# Patient Record
Sex: FEMALE | Race: WHITE | NOT HISPANIC OR LATINO
[De-identification: names, ages, dates, MRNs, and addresses within clinical notes are randomized per-mention and may not be internally consistent; named-entity substitution may affect disease eponyms.]

---

## 2020-01-09 PROBLEM — Z00.00 ENCOUNTER FOR PREVENTIVE HEALTH EXAMINATION: Status: ACTIVE | Noted: 2020-01-09

## 2020-01-10 ENCOUNTER — APPOINTMENT (OUTPATIENT)
Dept: ORTHOPEDIC SURGERY | Facility: CLINIC | Age: 45
End: 2020-01-10

## 2020-01-10 ENCOUNTER — APPOINTMENT (OUTPATIENT)
Dept: ORTHOPEDIC SURGERY | Facility: HOSPITAL | Age: 45
End: 2020-01-10

## 2020-01-13 ENCOUNTER — APPOINTMENT (OUTPATIENT)
Dept: ORTHOPEDIC SURGERY | Facility: CLINIC | Age: 45
End: 2020-01-13
Payer: MEDICAID

## 2020-01-13 VITALS
OXYGEN SATURATION: 98 % | BODY MASS INDEX: 18.25 KG/M2 | SYSTOLIC BLOOD PRESSURE: 118 MMHG | WEIGHT: 103 LBS | DIASTOLIC BLOOD PRESSURE: 74 MMHG | HEART RATE: 68 BPM | HEIGHT: 63 IN

## 2020-01-13 DIAGNOSIS — Z78.9 OTHER SPECIFIED HEALTH STATUS: ICD-10-CM

## 2020-01-13 DIAGNOSIS — Z87.81 PERSONAL HISTORY OF (HEALED) TRAUMATIC FRACTURE: ICD-10-CM

## 2020-01-13 PROCEDURE — 99203 OFFICE O/P NEW LOW 30 MIN: CPT

## 2020-01-13 NOTE — DISCUSSION/SUMMARY
[de-identified] : 45y/o female 2wk s/p left 5th metatarsal shaft fracture\par - WBAT, wean crutches gradually\par - Transition from hard soled shoe to supportive firm-soled regular shoewear as tolerated\par - Tylenol + Aleve as needed for pain\par - RTC 4wk with new left foot XRs

## 2020-01-13 NOTE — PHYSICAL EXAM
[de-identified] : General appearance: well nourished and hydrated, pleasant, alert and oriented x 3, cooperative.  \par HEENT: normocephalic, EOM intact, nasal septum midline, oral cavity clear, external auditory canal clear.  \par Cardiovascular: no lower leg edema, no varicosities, dorsalis pedis pulses palpable and symmetric.  \par Lymphatics: no palpable lymphadenopathy, no lymphedema.  \par Neurologic: sensation is normal, no muscle weakness in upper or lower extremities, patella tendon reflexes present and symmetric.  \par Dermatologic: skin moist, warm, no rash.  \par Spine: cervical spine with normal lordosis and painless range of motion, thoracic spine with normal kyphosis and painless range of motion, lumbosacral spine with normal lordosis and painless range of motion. \par Gait: deferred.\par \par Left foot\par - Inspection: mild swelling and ecchymosis throughout midfoot and forefoot. Negative erythema. No rotational deformity of the toes. Hallux valgus.\par - Wounds: none.  \par - ROM: able to flex/ext all toes.  \par - Palpation: appropriate tenderness to palpation along 5th metatarsal. Nontender at 1st TMT, no pain with 1st/2nd ray divergence.\par - Strength: deferred.\par - Stability: no instability noted.   [de-identified] : 3 views of the left foot were reviewed from outside imaging dated 1/3/20. There is a minimally displaced oblique fracture of the distal portion of the 5th metatarsal shaft without intra-articular extension. No other fractures identified. No arthritis. Hallux valgus.
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
## 2020-01-24 ENCOUNTER — APPOINTMENT (OUTPATIENT)
Dept: ORTHOPEDIC SURGERY | Facility: CLINIC | Age: 45
End: 2020-01-24
Payer: MEDICAID

## 2020-01-24 VITALS — WEIGHT: 103 LBS | RESPIRATION RATE: 16 BRPM | BODY MASS INDEX: 18.25 KG/M2 | HEIGHT: 63 IN

## 2020-01-24 PROCEDURE — 99213 OFFICE O/P EST LOW 20 MIN: CPT | Mod: 57

## 2020-01-24 PROCEDURE — 28470 CLTX METATARSAL FX WO MNP EA: CPT | Mod: LT

## 2020-01-24 PROCEDURE — 73630 X-RAY EXAM OF FOOT: CPT | Mod: LT

## 2020-02-10 ENCOUNTER — APPOINTMENT (OUTPATIENT)
Dept: ORTHOPEDIC SURGERY | Facility: CLINIC | Age: 45
End: 2020-02-10

## 2020-02-14 ENCOUNTER — APPOINTMENT (OUTPATIENT)
Dept: ORTHOPEDIC SURGERY | Facility: CLINIC | Age: 45
End: 2020-02-14
Payer: MEDICAID

## 2020-02-14 PROCEDURE — 73630 X-RAY EXAM OF FOOT: CPT | Mod: LT

## 2020-02-14 PROCEDURE — 99024 POSTOP FOLLOW-UP VISIT: CPT

## 2020-04-17 ENCOUNTER — APPOINTMENT (OUTPATIENT)
Dept: ORTHOPEDIC SURGERY | Facility: CLINIC | Age: 45
End: 2020-04-17

## 2020-10-02 ENCOUNTER — APPOINTMENT (OUTPATIENT)
Dept: ORTHOPEDIC SURGERY | Facility: CLINIC | Age: 45
End: 2020-10-02
Payer: MEDICAID

## 2020-10-02 VITALS — WEIGHT: 103 LBS | HEIGHT: 63 IN | BODY MASS INDEX: 18.25 KG/M2 | RESPIRATION RATE: 16 BRPM

## 2020-10-02 DIAGNOSIS — S92.352A DISPLACED FRACTURE OF FIFTH METATARSAL BONE, LEFT FOOT, INITIAL ENCOUNTER FOR CLOSED FRACTURE: ICD-10-CM

## 2020-10-02 PROCEDURE — 73630 X-RAY EXAM OF FOOT: CPT | Mod: LT

## 2020-10-02 PROCEDURE — 99214 OFFICE O/P EST MOD 30 MIN: CPT

## 2022-01-21 ENCOUNTER — NON-APPOINTMENT (OUTPATIENT)
Age: 47
End: 2022-01-21

## 2022-01-26 ENCOUNTER — APPOINTMENT (OUTPATIENT)
Dept: BREAST CENTER | Facility: CLINIC | Age: 47
End: 2022-01-26
Payer: MEDICAID

## 2022-01-26 VITALS — OXYGEN SATURATION: 98 % | WEIGHT: 100 LBS | HEART RATE: 81 BPM | HEIGHT: 63 IN | BODY MASS INDEX: 17.72 KG/M2

## 2022-01-26 DIAGNOSIS — Z80.3 FAMILY HISTORY OF MALIGNANT NEOPLASM OF BREAST: ICD-10-CM

## 2022-01-26 DIAGNOSIS — D24.2 BENIGN NEOPLASM OF LEFT BREAST: ICD-10-CM

## 2022-01-26 DIAGNOSIS — Z86.79 PERSONAL HISTORY OF OTHER DISEASES OF THE CIRCULATORY SYSTEM: ICD-10-CM

## 2022-01-26 PROCEDURE — 99204 OFFICE O/P NEW MOD 45 MIN: CPT

## 2022-01-27 NOTE — PHYSICAL EXAM
[No Supraclavicular Adenopathy] : no supraclavicular adenopathy [Examined in the supine and seated position] : examined in the supine and seated position [Asymmetrical] : asymmetrical [No dominant masses] : no dominant masses left breast [No Nipple Retraction] : no left nipple retraction [No Nipple Discharge] : no left nipple discharge [No Axillary Lymphadenopathy] : no left axillary lymphadenopathy [No Rashes] : no rashes [No Ulceration] : no ulceration [Breast Nipple Inversion] : nipples not inverted [Breast Nipple Retraction] : nipples not retracted [Breast Nipple Flattening] : nipples not flattened [de-identified] : 7:00 1cmFN palpable nodule

## 2022-01-27 NOTE — ASSESSMENT
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 We recommend B/L U/S in 6 months to monitor R breast nodule as well. Patient will have B/L US and RTC same day in June 2022. Patient verbalized understanding and agreement of plan.\par

## 2022-01-27 NOTE — DATA REVIEWED
[FreeTextEntry1] : 11/5/21 B/L Screening MMG/US ([facility]): Nodule in the lower inner quadrant of the left breast without a correlate on screening ultrasound. Diagnostic mammography and breast also recommended. Spot compression, ML tomosynthesis views and targeted US are suggested. Circumscribed nodule in the 7:00 position of the right breast which the patient states has been present for many years. Further management palpable nodularity should be based on clinical assessment. Comparison with prior studies\par to show stability is recommended. In the absence of prior studies, a six-month follow-up targeted ultrasound is recommended. See full report. BIRADS 0 - Incomplete. \par \par 11/29/21 L Breast MMG/US ([facility]): Well marginated solid lesion seen sonographically correlating with the mammographically demonstrated lesion as described. Recommend ultrasound-guided core biopsy with clip placement and postprocedure left mammogram for further evaluation. BIRADS 4 - Suspicious.  \par \par 12/10/21 L US Guided Biopsy Pathology: Left breast 8:00 4cmFN fibroadenoma. Benign concordant.

## 2022-01-27 NOTE — HISTORY OF PRESENT ILLNESS
[FreeTextEntry1] : 45 y/o female referred by Dr. Verde presents for initial consultation regarding a L breast fibroadenoma s/p US guided biopsy on 12/10/21. Patient states she initially felt a lump over the right breast so she went for imaging and right breast lump corresponded with a stable nodule, no follow up needed however nodule on the Left breast suspicious recommending US guided biopsy. Patient has history of benign breast excision when she was 26. Denies any other surgeries or biopsies. Patient has family history of breast CA Mother diagnosed at age 49, alive and well. Denies any other family history. Denies any palpable abnormalities, skin changes, nipple changes or nipple discharge bilaterally.\par

## 2022-01-27 NOTE — CONSULT LETTER
[Dear  ___] : Dear ~JAZMÍN, [Consult Letter:] : I had the pleasure of evaluating your patient, [unfilled]. [Please see my note below.] : Please see my note below. [Consult Closing:] : Thank you very much for allowing me to participate in the care of this patient.  If you have any questions, please do not hesitate to contact me. [Sincerely,] : Sincerely, [FreeTextEntry2] : Dr. Verde [FreeTextEntry3] : Phil\par \par Phil Trejo MD\par Chief of Breast Surgery\par Director of Breast Cancer Program\par [facility]\par \par \par

## 2022-05-23 ENCOUNTER — NON-APPOINTMENT (OUTPATIENT)
Age: 47
End: 2022-05-23

## 2022-05-24 ENCOUNTER — NON-APPOINTMENT (OUTPATIENT)
Age: 47
End: 2022-05-24

## 2022-05-24 ENCOUNTER — APPOINTMENT (OUTPATIENT)
Dept: BREAST CENTER | Facility: CLINIC | Age: 47
End: 2022-05-24

## 2022-06-02 ENCOUNTER — NON-APPOINTMENT (OUTPATIENT)
Age: 47
End: 2022-06-02

## 2022-06-03 ENCOUNTER — TRANSCRIPTION ENCOUNTER (OUTPATIENT)
Age: 47
End: 2022-06-03

## 2022-06-03 ENCOUNTER — APPOINTMENT (OUTPATIENT)
Dept: ORTHOPEDIC SURGERY | Facility: CLINIC | Age: 47
End: 2022-06-03
Payer: MEDICAID

## 2022-06-03 VITALS — RESPIRATION RATE: 16 BRPM | WEIGHT: 100 LBS | HEIGHT: 63 IN | BODY MASS INDEX: 17.72 KG/M2

## 2022-06-03 PROCEDURE — 99214 OFFICE O/P EST MOD 30 MIN: CPT

## 2022-06-03 RX ORDER — CHOLECALCIFEROL (VITAMIN D3) 1250 MCG
1.25 MG CAPSULE ORAL
Qty: 10 | Refills: 0 | Status: ACTIVE | COMMUNITY
Start: 2022-06-03 | End: 1900-01-01

## 2022-06-08 ENCOUNTER — NON-APPOINTMENT (OUTPATIENT)
Age: 47
End: 2022-06-08

## 2022-06-23 ENCOUNTER — APPOINTMENT (OUTPATIENT)
Dept: ORTHOPEDIC SURGERY | Facility: CLINIC | Age: 47
End: 2022-06-23

## 2022-06-23 ENCOUNTER — NON-APPOINTMENT (OUTPATIENT)
Age: 47
End: 2022-06-23

## 2022-06-23 DIAGNOSIS — M79.671 PAIN IN RIGHT FOOT: ICD-10-CM

## 2022-06-23 DIAGNOSIS — M84.374A STRESS FRACTURE, RIGHT FOOT, INITIAL ENCOUNTER FOR FRACTURE: ICD-10-CM

## 2022-06-23 PROCEDURE — 99441: CPT

## 2022-06-24 ENCOUNTER — APPOINTMENT (OUTPATIENT)
Dept: RHEUMATOLOGY | Facility: CLINIC | Age: 47
End: 2022-06-24

## 2022-06-24 ENCOUNTER — NON-APPOINTMENT (OUTPATIENT)
Age: 47
End: 2022-06-24

## 2022-06-24 VITALS
HEART RATE: 49 BPM | BODY MASS INDEX: 17.72 KG/M2 | TEMPERATURE: 98.2 F | SYSTOLIC BLOOD PRESSURE: 119 MMHG | DIASTOLIC BLOOD PRESSURE: 70 MMHG | HEIGHT: 63 IN | WEIGHT: 100 LBS | OXYGEN SATURATION: 95 %

## 2022-06-24 DIAGNOSIS — M89.9 DISORDER OF BONE, UNSPECIFIED: ICD-10-CM

## 2022-06-24 PROCEDURE — 99203 OFFICE O/P NEW LOW 30 MIN: CPT

## 2022-06-28 PROBLEM — M89.9 DISORDER OF BONE: Status: ACTIVE | Noted: 2022-06-28

## 2022-06-29 NOTE — PHYSICAL EXAM
[General Appearance - Alert] : alert [General Appearance - In No Acute Distress] : in no acute distress [General Appearance - Well-Appearing] : healthy appearing [Sclera] : the sclera and conjunctiva were normal [Respiration, Rhythm And Depth] : normal respiratory rhythm and effort [Exaggerated Use Of Accessory Muscles For Inspiration] : no accessory muscle use [Edema] : there was no peripheral edema [] : no rash [Oriented To Time, Place, And Person] : oriented to person, place, and time [Impaired Insight] : insight and judgment were intact [Affect] : the affect was normal [FreeTextEntry1] : CAM boot on RLE
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
## 2022-06-29 NOTE — DATA REVIEWED
[FreeTextEntry1] : BASIL MONET MD\par [address]\par [facility] [id number]\par SITE PERFORMED: [address] [place] [address]\par Patient: MARY GRANADOS\par YOB: 1975\par Phone: [phone]\par MRN: [id number] Acc: [id number]\par Date of Exam: 06-\par  \par EXAM:  DEXA SCAN WITH VERTEBRAL FRACTURE ASSESSMENT\par \par HISTORY:  Evaluate bone density. Female: Premenopausal with history of . Metatarsal stress fracture \par \par TECHNIQUE:  The study was done on a Hologic densitometer. \par \par FINDINGS:  \par AP Lumbar spine L1-L4 T-score -1.2. Z-score -0.7.\par Left femoral neck T-score -0.7. Z-score -0.3.\par Left total hip T-score -0.9. Z-score -0.6.\par \par No gross hip structural abnormality. No gross lumbar spine structural abnormality.\par \par FINDINGS (VFA): A semiquantitative analysis was performed by using a six-marker point method to describe the shape and deformity of each vertebra. AP and lateral scanograms of the thoracolumbar spine demonstrate no fracture.\par \par IMPRESSION: Based on Z-scores in the lumbar spine and left hip, the patient's bone mass is within the expected range for age. **\par \par ** Z-score refers to the number of standard deviations the patient's BMD is above or below the average of patients matched for age and sex.  For premenopausal women <50 years old, the World Health Organization (WHO) guidelines are not used. For these patients, a Z-score greater than -2.0 is within the expected range. A Z-score lower than -2.0 is lower than the expected range.\par \par Thank you for the opportunity to participate in the care of this patient.  \par  \par GREGORIA BURNHAM MD  - Electronically Signed: 06- 12:52 PM \par Physician to Physician Direct Line is: [phone]  \par EXAM:  MRI RIGHT FOOT WITHOUT CONTRAST\par \par \par HISTORY: Foot pain.\par \par TECHNIQUE:  Multiplanar, multi-sequential MRI of the right midfoot/forefoot was performed on a 3T scanner according to standard protocol.\par \par COMPARISON:  None available.\par \par FINDINGS:\par \par Bones/joints: There is a nondisplaced, incomplete stress fracture through the proximal shaft of the second metatarsal with background stress reaction.\par No additional fracture or osseous stress injury. No osteonecrosis.  \par Joints are preserved without degenerative changes. No effusions.  \par \par Ligaments/capsular structures: The Lisfranc ligament complex is intact. \par Low-grade sprains of the interosseous and plantar bands of the Lisfranc ligament complex. The dorsal band is unremarkable. No ligament rupture.\par \par Musculature: Unremarkable.\par \par Tendons: Visualized segments of the flexor and extensor tendons are unremarkable.\par \par Plantar fascia: The distal segments of the cords are unremarkable.\par \par Subcutaneous tissues: Unremarkable.\par \par IMPRESSION:  MRI of the right midfoot/forefoot demonstrates:\par \par 1.  Nondisplaced, incomplete stress fracture of the proximal shaft of the second metatarsal, with local background stress reaction.\par 2.  Low-grade sprains of the interosseous and plantar bands of the Lisfranc ligament complex. No ligament rupture.\par \par Thank you for the opportunity to participate in the care of this patient.  \par  \par Evie Mahan MD  - Electronically Signed: 06- 12:21 PM \par Physician to Physician Direct Line is: [phone]

## 2022-07-15 ENCOUNTER — APPOINTMENT (OUTPATIENT)
Dept: ORTHOPEDIC SURGERY | Facility: CLINIC | Age: 47
End: 2022-07-15

## 2022-07-15 PROCEDURE — 99213 OFFICE O/P EST LOW 20 MIN: CPT

## 2022-07-18 VITALS — WEIGHT: 100 LBS | RESPIRATION RATE: 16 BRPM | HEIGHT: 63 IN | BODY MASS INDEX: 17.72 KG/M2

## 2022-07-21 PROBLEM — M79.671 ACUTE PAIN OF RIGHT FOOT: Status: ACTIVE | Noted: 2022-06-03

## 2022-07-21 PROBLEM — M84.374A STRESS FRACTURE OF RIGHT FOOT, INITIAL ENCOUNTER: Status: ACTIVE | Noted: 2022-07-15

## 2022-08-10 ENCOUNTER — RX RENEWAL (OUTPATIENT)
Age: 47
End: 2022-08-10

## 2022-08-10 RX ORDER — CHOLECALCIFEROL (VITAMIN D3) 1250 MCG
1.25 MG CAPSULE ORAL
Qty: 4 | Refills: 2 | Status: ACTIVE | COMMUNITY
Start: 2022-08-10 | End: 1900-01-01

## 2022-10-19 ENCOUNTER — APPOINTMENT (OUTPATIENT)
Dept: ORTHOPEDIC SURGERY | Facility: CLINIC | Age: 47
End: 2022-10-19

## 2022-10-19 VITALS — HEIGHT: 63 IN | WEIGHT: 100 LBS | BODY MASS INDEX: 17.72 KG/M2 | RESPIRATION RATE: 16 BRPM

## 2022-10-19 DIAGNOSIS — S92.324A NONDISPLACED FRACTURE OF SECOND METATARSAL BONE, RIGHT FOOT, INITIAL ENCOUNTER FOR CLOSED FRACTURE: ICD-10-CM

## 2022-10-19 PROCEDURE — 73630 X-RAY EXAM OF FOOT: CPT | Mod: RT

## 2022-10-19 PROCEDURE — 99213 OFFICE O/P EST LOW 20 MIN: CPT

## 2022-11-01 ENCOUNTER — APPOINTMENT (OUTPATIENT)
Dept: ORTHOPEDIC SURGERY | Facility: CLINIC | Age: 47
End: 2022-11-01

## 2022-11-01 DIAGNOSIS — G89.29 PAIN IN RIGHT KNEE: ICD-10-CM

## 2022-11-01 DIAGNOSIS — M25.561 PAIN IN RIGHT KNEE: ICD-10-CM

## 2022-11-01 PROCEDURE — 73562 X-RAY EXAM OF KNEE 3: CPT | Mod: RT

## 2022-11-01 PROCEDURE — 99213 OFFICE O/P EST LOW 20 MIN: CPT

## 2022-11-29 ENCOUNTER — RX RENEWAL (OUTPATIENT)
Age: 47
End: 2022-11-29

## 2022-12-19 ENCOUNTER — RX RENEWAL (OUTPATIENT)
Age: 47
End: 2022-12-19

## 2023-05-09 ENCOUNTER — APPOINTMENT (OUTPATIENT)
Dept: ORTHOPEDIC SURGERY | Facility: CLINIC | Age: 48
End: 2023-05-09

## 2023-05-09 ENCOUNTER — APPOINTMENT (OUTPATIENT)
Dept: ORTHOPEDIC SURGERY | Facility: CLINIC | Age: 48
End: 2023-05-09
Payer: MEDICAID

## 2023-05-09 DIAGNOSIS — M79.673 PAIN IN UNSPECIFIED FOOT: ICD-10-CM

## 2023-05-09 DIAGNOSIS — M25.579 PAIN IN UNSPECIFIED ANKLE AND JOINTS OF UNSPECIFIED FOOT: ICD-10-CM

## 2023-05-09 DIAGNOSIS — M76.822 POSTERIOR TIBIAL TENDINITIS, LEFT LEG: ICD-10-CM

## 2023-05-09 DIAGNOSIS — M25.572 PAIN IN LEFT ANKLE AND JOINTS OF LEFT FOOT: ICD-10-CM

## 2023-05-09 PROCEDURE — 73610 X-RAY EXAM OF ANKLE: CPT | Mod: 26,LT

## 2023-05-09 PROCEDURE — 73630 X-RAY EXAM OF FOOT: CPT | Mod: 26,LT

## 2023-05-09 PROCEDURE — 99213 OFFICE O/P EST LOW 20 MIN: CPT

## 2023-05-11 ENCOUNTER — APPOINTMENT (OUTPATIENT)
Dept: ORTHOPEDIC SURGERY | Facility: CLINIC | Age: 48
End: 2023-05-11

## 2024-11-04 ENCOUNTER — NON-APPOINTMENT (OUTPATIENT)
Age: 49
End: 2024-11-04

## 2025-01-29 ENCOUNTER — APPOINTMENT (OUTPATIENT)
Dept: BREAST CENTER | Facility: CLINIC | Age: 50
End: 2025-01-29

## 2025-01-29 ENCOUNTER — RESULT REVIEW (OUTPATIENT)
Age: 50
End: 2025-01-29

## 2025-01-29 VITALS — BODY MASS INDEX: 18.07 KG/M2 | HEIGHT: 63 IN | WEIGHT: 102 LBS

## 2025-01-29 DIAGNOSIS — Z80.3 FAMILY HISTORY OF MALIGNANT NEOPLASM OF BREAST: ICD-10-CM

## 2025-01-29 DIAGNOSIS — Z78.9 OTHER SPECIFIED HEALTH STATUS: ICD-10-CM

## 2025-01-29 DIAGNOSIS — N63.10 UNSPECIFIED LUMP IN THE RIGHT BREAST, UNSPECIFIED QUADRANT: ICD-10-CM

## 2025-01-29 DIAGNOSIS — Z80.1 FAMILY HISTORY OF MALIGNANT NEOPLASM OF TRACHEA, BRONCHUS AND LUNG: ICD-10-CM

## 2025-01-29 DIAGNOSIS — Z12.39 ENCOUNTER FOR OTHER SCREENING FOR MALIGNANT NEOPLASM OF BREAST: ICD-10-CM

## 2025-01-29 DIAGNOSIS — D24.2 BENIGN NEOPLASM OF LEFT BREAST: ICD-10-CM

## 2025-01-29 PROCEDURE — 99204 OFFICE O/P NEW MOD 45 MIN: CPT

## 2025-01-29 PROCEDURE — 76642 ULTRASOUND BREAST LIMITED: CPT | Mod: RT

## 2025-01-29 RX ORDER — ELECTROLYTES/DEXTROSE
SOLUTION, ORAL ORAL
Refills: 0 | Status: ACTIVE | COMMUNITY

## 2025-01-29 RX ORDER — CHOLECALCIFEROL (VITAMIN D3) 25 MCG
TABLET ORAL
Refills: 0 | Status: ACTIVE | COMMUNITY

## 2025-02-06 ENCOUNTER — RESULT REVIEW (OUTPATIENT)
Age: 50
End: 2025-02-06

## 2025-02-06 ENCOUNTER — APPOINTMENT (OUTPATIENT)
Dept: MAMMOGRAPHY | Facility: CLINIC | Age: 50
End: 2025-02-06
Payer: MEDICAID

## 2025-02-06 ENCOUNTER — APPOINTMENT (OUTPATIENT)
Dept: ULTRASOUND IMAGING | Facility: CLINIC | Age: 50
End: 2025-02-06
Payer: MEDICAID

## 2025-02-06 PROCEDURE — 76641 ULTRASOUND BREAST COMPLETE: CPT | Mod: 50

## 2025-02-06 PROCEDURE — 77062 BREAST TOMOSYNTHESIS BI: CPT

## 2025-02-06 PROCEDURE — 77066 DX MAMMO INCL CAD BI: CPT

## 2025-04-24 ENCOUNTER — NON-APPOINTMENT (OUTPATIENT)
Age: 50
End: 2025-04-24

## 2025-06-02 DIAGNOSIS — R92.8 OTHER ABNORMAL AND INCONCLUSIVE FINDINGS ON DIAGNOSTIC IMAGING OF BREAST: ICD-10-CM

## 2025-06-23 ENCOUNTER — RESULT REVIEW (OUTPATIENT)
Age: 50
End: 2025-06-23

## 2025-06-23 ENCOUNTER — APPOINTMENT (OUTPATIENT)
Dept: ULTRASOUND IMAGING | Facility: CLINIC | Age: 50
End: 2025-06-23
Payer: MEDICAID

## 2025-06-23 PROCEDURE — 77065 DX MAMMO INCL CAD UNI: CPT | Mod: RT

## 2025-06-23 PROCEDURE — 19083 BX BREAST 1ST LESION US IMAG: CPT | Mod: RT

## 2025-06-23 PROCEDURE — A4648: CPT

## 2025-06-26 ENCOUNTER — NON-APPOINTMENT (OUTPATIENT)
Age: 50
End: 2025-06-26

## 2025-07-18 PROBLEM — D24.1 FIBROADENOMA OF RIGHT BREAST: Status: ACTIVE | Noted: 2025-07-18

## 2025-07-18 PROBLEM — Z98.890 S/P BREAST BIOPSY: Status: ACTIVE | Noted: 2025-07-18

## 2025-07-23 ENCOUNTER — APPOINTMENT (OUTPATIENT)
Dept: BREAST CENTER | Facility: CLINIC | Age: 50
End: 2025-07-23
Payer: MEDICAID

## 2025-07-23 ENCOUNTER — NON-APPOINTMENT (OUTPATIENT)
Age: 50
End: 2025-07-23

## 2025-07-23 VITALS
DIASTOLIC BLOOD PRESSURE: 60 MMHG | SYSTOLIC BLOOD PRESSURE: 97 MMHG | HEIGHT: 63 IN | WEIGHT: 102 LBS | HEART RATE: 43 BPM | BODY MASS INDEX: 18.07 KG/M2

## 2025-07-23 DIAGNOSIS — D24.2 BENIGN NEOPLASM OF LEFT BREAST: ICD-10-CM

## 2025-07-23 DIAGNOSIS — Z80.3 FAMILY HISTORY OF MALIGNANT NEOPLASM OF BREAST: ICD-10-CM

## 2025-07-23 DIAGNOSIS — R92.8 OTHER ABNORMAL AND INCONCLUSIVE FINDINGS ON DIAGNOSTIC IMAGING OF BREAST: ICD-10-CM

## 2025-07-23 DIAGNOSIS — Z12.39 ENCOUNTER FOR OTHER SCREENING FOR MALIGNANT NEOPLASM OF BREAST: ICD-10-CM

## 2025-07-23 DIAGNOSIS — D24.1 BENIGN NEOPLASM OF RIGHT BREAST: ICD-10-CM

## 2025-07-23 DIAGNOSIS — Z98.890 OTHER SPECIFIED POSTPROCEDURAL STATES: ICD-10-CM

## 2025-07-23 PROCEDURE — 99214 OFFICE O/P EST MOD 30 MIN: CPT

## 2025-08-12 ENCOUNTER — APPOINTMENT (OUTPATIENT)
Dept: ULTRASOUND IMAGING | Facility: CLINIC | Age: 50
End: 2025-08-12
Payer: MEDICAID

## 2025-08-12 ENCOUNTER — RESULT REVIEW (OUTPATIENT)
Age: 50
End: 2025-08-12

## 2025-08-12 PROCEDURE — 76641 ULTRASOUND BREAST COMPLETE: CPT | Mod: 50
